# Patient Record
Sex: MALE | Race: WHITE | NOT HISPANIC OR LATINO | Employment: OTHER | URBAN - METROPOLITAN AREA
[De-identification: names, ages, dates, MRNs, and addresses within clinical notes are randomized per-mention and may not be internally consistent; named-entity substitution may affect disease eponyms.]

---

## 2017-11-01 PROBLEM — I73.9 PERIPHERAL VASCULAR DISEASE (CMS/HCC): Status: ACTIVE | Noted: 2017-10-04

## 2017-11-01 PROBLEM — J20.9 ACUTE BRONCHITIS: Status: ACTIVE | Noted: 2017-11-01

## 2017-11-01 PROBLEM — K21.9 GASTROESOPHAGEAL REFLUX DISEASE: Status: ACTIVE | Noted: 2017-11-01

## 2017-11-01 PROBLEM — M50.30 DEGENERATION OF CERVICAL INTERVERTEBRAL DISC: Status: ACTIVE | Noted: 2017-11-01

## 2017-11-01 PROBLEM — F32.A DEPRESSIVE DISORDER: Status: ACTIVE | Noted: 2017-11-01

## 2017-11-01 PROBLEM — R59.0 CERVICAL LYMPHADENOPATHY: Status: ACTIVE | Noted: 2017-11-01

## 2017-11-01 PROBLEM — Z01.818 ENCOUNTER FOR OTHER PREPROCEDURAL EXAMINATION: Status: ACTIVE | Noted: 2017-10-12

## 2017-11-01 PROBLEM — E11.9 DIABETES MELLITUS (CMS/HCC): Status: ACTIVE | Noted: 2017-11-01

## 2017-11-01 PROBLEM — E04.9 NODULAR GOITER: Status: ACTIVE | Noted: 2017-11-01

## 2017-11-01 PROBLEM — F17.200 TOBACCO DEPENDENCE SYNDROME: Status: ACTIVE | Noted: 2017-11-01

## 2017-11-01 PROBLEM — M06.9 RHEUMATOID ARTHRITIS (CMS/HCC): Status: ACTIVE | Noted: 2017-11-01

## 2017-11-01 PROBLEM — E78.5 HYPERLIPIDEMIA: Status: ACTIVE | Noted: 2017-11-01

## 2017-11-01 PROBLEM — J44.9 CHRONIC OBSTRUCTIVE LUNG DISEASE (CMS/HCC): Status: ACTIVE | Noted: 2017-11-01

## 2017-11-01 PROBLEM — G89.29 CHRONIC BACK PAIN: Status: ACTIVE | Noted: 2017-11-01

## 2017-11-01 PROBLEM — R68.82 REDUCED LIBIDO: Status: ACTIVE | Noted: 2017-11-01

## 2017-11-01 PROBLEM — M51.379 DEGENERATION OF LUMBOSACRAL INTERVERTEBRAL DISC: Status: ACTIVE | Noted: 2017-11-01

## 2017-11-01 PROBLEM — M54.9 CHRONIC BACK PAIN: Status: ACTIVE | Noted: 2017-11-01

## 2017-11-01 PROBLEM — G25.81 RESTLESS LEGS: Status: ACTIVE | Noted: 2017-11-01

## 2017-12-15 ENCOUNTER — ANCILLARY PROCEDURE (OUTPATIENT)
Dept: CARDIOLOGY | Facility: CLINIC | Age: 62
End: 2017-12-15
Payer: COMMERCIAL

## 2017-12-15 DIAGNOSIS — I73.9 PVD (PERIPHERAL VASCULAR DISEASE) (CMS/HCC): ICD-10-CM

## 2017-12-15 DIAGNOSIS — I77.9 PERIPHERAL ARTERIAL OCCLUSIVE DISEASE (CMS/HCC): ICD-10-CM

## 2017-12-15 PROCEDURE — 93922 UPR/L XTREMITY ART 2 LEVELS: CPT | Mod: PO

## 2017-12-15 PROCEDURE — 93922 UPR/L XTREMITY ART 2 LEVELS: CPT | Performed by: INTERNAL MEDICINE

## 2017-12-15 PROCEDURE — 93926 LOWER EXTREMITY STUDY: CPT | Mod: PO

## 2017-12-15 PROCEDURE — 93926 LOWER EXTREMITY STUDY: CPT | Performed by: INTERNAL MEDICINE

## 2017-12-15 PROCEDURE — 93926 LOWER EXTREMITY STUDY: CPT | Mod: LT,PO

## 2017-12-28 LAB
LEFT ABI: 1.18
LEFT ANT TIBIAL SYS PSV: 57 CM/S
LEFT ANTERIOR TIBIAL: 135 MMHG
LEFT ARM BP: 125 MMHG
LEFT PERONEAL SYS PSV: 81 CM/S
LEFT POST TIBIAL SYS PSV: 92 CM/S
LEFT POSTERIOR TIBIAL: 147 MMHG
LEFT PROFUNDA SYS PSV: 76 CM/S
LEFT SUPER FEMORAL PROX SYS PSV: 204 CM/S
LEFT SUPER FEMORAL PROX SYS RATIO: 2.17
LEFT TIB/PER TRUNK SYS PSV: 68 CM/S
LL ARTERIAL DUPLEX COMMON FEMORAL PEAK SYS VEL: 94 CM/S
LL ARTERIAL DUPLEX POPLITEAL PEAK SYS VEL: 91 CM/S
RH LOWER EXTREMITY GRAFT 1 ANAST: 66 CM/S
RH LOWER EXTREMITY GRAFT 1 DIST ANAST: 114 CM/S
RH LOWER EXTREMITY GRAFT 1 DIST: 72 CM/S
RH LOWER EXTREMITY GRAFT 1 INFLOW: 118 CM/S
RH LOWER EXTREMITY GRAFT 1 MID: 88 CM/S
RH LOWER EXTREMITY GRAFT 1 OUTFLOW: 109 CM/S
RH LOWER EXTREMITY GRAFT 1 PROX: 86 CM/S
RH LT LOWER ART ANT TIB DIST: 72 CM/S
RH LT LOWER ART PER DIST: 49 CM/S
RH LT LOWER ART POP DIST: 86 CM/S
RH LT LOWER ART POST TIB DIST: 116 CM/S
RH LT LOWER ART SFA DIST: 140
RH LT LOWER ART SFA MID: 157 CM/S
RIGHT ABI: 1.06
RIGHT ANTERIOR TIBIAL: 133 MMHG
RIGHT ARM BP: 120 MMHG
RIGHT POSTERIOR TIBIAL: 133 MMHG

## 2018-04-23 ENCOUNTER — OFFICE VISIT (OUTPATIENT)
Dept: FAMILY MEDICINE | Facility: CLINIC | Age: 63
End: 2018-04-23
Payer: MEDICARE

## 2018-04-23 VITALS
WEIGHT: 185 LBS | OXYGEN SATURATION: 96 % | HEART RATE: 90 BPM | HEIGHT: 66 IN | BODY MASS INDEX: 29.73 KG/M2 | TEMPERATURE: 98.5 F | DIASTOLIC BLOOD PRESSURE: 74 MMHG | RESPIRATION RATE: 18 BRPM | SYSTOLIC BLOOD PRESSURE: 140 MMHG

## 2018-04-23 DIAGNOSIS — M79.672 LEFT FOOT PAIN: Primary | ICD-10-CM

## 2018-04-23 PROCEDURE — 99213 OFFICE O/P EST LOW 20 MIN: CPT | Performed by: PHYSICIAN ASSISTANT

## 2018-04-23 ASSESSMENT — PAIN SCALES - GENERAL: PAINLEVEL: 8

## 2018-04-26 ASSESSMENT — ENCOUNTER SYMPTOMS
CONSTITUTIONAL NEGATIVE: 1
CARDIOVASCULAR NEGATIVE: 1
ROS SKIN COMMENTS: PAIN LEFT FOOT
RESPIRATORY NEGATIVE: 1

## 2018-05-21 ENCOUNTER — OFFICE VISIT (OUTPATIENT)
Dept: FAMILY MEDICINE | Facility: CLINIC | Age: 63
End: 2018-05-21
Payer: MEDICARE

## 2018-05-21 VITALS
OXYGEN SATURATION: 98 % | SYSTOLIC BLOOD PRESSURE: 124 MMHG | HEART RATE: 88 BPM | WEIGHT: 178.6 LBS | DIASTOLIC BLOOD PRESSURE: 80 MMHG | RESPIRATION RATE: 20 BRPM | TEMPERATURE: 98.5 F | BODY MASS INDEX: 28.7 KG/M2 | HEIGHT: 66 IN

## 2018-05-21 DIAGNOSIS — R11.2 INTRACTABLE VOMITING WITH NAUSEA, UNSPECIFIED VOMITING TYPE: Primary | ICD-10-CM

## 2018-05-21 DIAGNOSIS — R10.33 PERIUMBILICAL ABDOMINAL PAIN: ICD-10-CM

## 2018-05-21 PROCEDURE — 96372 THER/PROPH/DIAG INJ SC/IM: CPT | Performed by: FAMILY MEDICINE

## 2018-05-21 PROCEDURE — 99213 OFFICE O/P EST LOW 20 MIN: CPT | Performed by: FAMILY MEDICINE

## 2018-05-21 RX ORDER — PROMETHAZINE HYDROCHLORIDE 25 MG/ML
25 INJECTION, SOLUTION INTRAMUSCULAR; INTRAVENOUS ONCE
Status: COMPLETED | OUTPATIENT
Start: 2018-05-21 | End: 2018-05-21

## 2018-05-21 RX ADMIN — PROMETHAZINE HYDROCHLORIDE 25 MG: 25 INJECTION, SOLUTION INTRAMUSCULAR; INTRAVENOUS at 10:42

## 2018-05-22 ASSESSMENT — ENCOUNTER SYMPTOMS
FEVER: 0
DIFFICULTY URINATING: 0
ABDOMINAL PAIN: 1
NAUSEA: 1
VOMITING: 1
CONFUSION: 0
TROUBLE SWALLOWING: 1
CHILLS: 0
UNEXPECTED WEIGHT CHANGE: 0
HEADACHES: 0
ARTHRALGIAS: 0
DIZZINESS: 0
COUGH: 0
SORE THROAT: 0
BLOOD IN STOOL: 0
SHORTNESS OF BREATH: 0

## 2018-05-22 NOTE — PROGRESS NOTES
"Subjective      Patient ID: Chris Hendricks is a 62 y.o. male.    This patient is in for evaluation of nausea and vomiting that began acutely at about 3 PM yesterday after he had an episode of food becoming blocked in his esophagus.  He has had continuous symptoms since then and describes a \"burning pain\" that developed after the onset of the nausea and vomiting.  The pain is located in the periumbilical area and has not radiated.  He has not been able to take any foods in because of the nausea and vomiting.  He denies symptoms of esophageal blockage at this time.    He had a similar spell approximately 1 year ago and was hospitalized at the [facility] where he had evaluation and workup for that.  He describes that he had no firm diagnosis regarding the etiology of the symptoms at that time.    He does get his primary care at the [facility] facility.        The following have been reviewed and updated as appropriate in this visit:  No text in SmartText      Review of Systems   Constitutional: Negative for chills, fever and unexpected weight change.   HENT: Positive for trouble swallowing. Negative for congestion and sore throat.    Respiratory: Negative for cough and shortness of breath.    Cardiovascular: Negative for chest pain and leg swelling.   Gastrointestinal: Positive for abdominal pain, nausea and vomiting. Negative for blood in stool.   Genitourinary: Negative for difficulty urinating.   Musculoskeletal: Negative for arthralgias.   Neurological: Negative for dizziness and headaches.   Psychiatric/Behavioral: Negative for confusion.       Objective     Physical Exam   Constitutional: He is oriented to person, place, and time. He appears well-developed and well-nourished. No distress.   Neck: No thyromegaly present.   Cardiovascular: Normal rate, regular rhythm, normal heart sounds and intact distal pulses.    No murmur heard.  Pulmonary/Chest: Effort normal and breath sounds normal. No " respiratory distress. He has no rales.   Abdominal: Soft. Bowel sounds are normal. He exhibits no distension and no mass. There is tenderness (flower-umbilical).   Musculoskeletal: He exhibits no edema.   Lymphadenopathy:     He has no cervical adenopathy.   Neurological: He is alert and oriented to person, place, and time.   Skin: No rash noted.   Psychiatric: He has a normal mood and affect. His behavior is normal.   Vitals reviewed.      Assessment/Plan   Problem List Items Addressed This Visit     None      Visit Diagnoses     Intractable vomiting with nausea, unspecified vomiting type    -  Primary    Relevant Medications    promethazine (PHENERGAN) injection 25 mg (Completed)    Periumbilical abdominal pain            1.  He has symptoms that are unrelenting regarding nausea and vomiting and abdominal pain.  His main [facility] get [facility] and treatment.    2.  He requests an injection for nausea and I have provided promethazine.    3.  I contacted [facility] facility ([phone]) and have alerted them that he will be coming to their facility for outpatient evaluation and/or emergency room evaluation.    4.  He can follow-up with me as necessary.

## 2018-06-12 ENCOUNTER — TELEPHONE (OUTPATIENT)
Dept: CARDIOLOGY | Facility: CLINIC | Age: 63
End: 2018-06-12

## 2018-06-12 NOTE — TELEPHONE ENCOUNTER
Spoke with Bria Wheeler, [facility] the [facility] in [facility] (covering for Radha), states that Dr. Koenig request for a Graft Surveillance Ultrasound (Arterial Duplex US Lower Extremity Bilateral) with REGGIE's [facility] at the [facility]. [facility] on [facility] to fax number [phone]. Ultrasounds and REGGIE was requested at time ordered however remains in pending with Radiology. Bria will call Radiology and request testing to be performed ASAP. SofiGuSelwyn JACOBSONN II.

## 2018-07-09 ENCOUNTER — OFFICE VISIT (OUTPATIENT)
Dept: URGENT CARE | Facility: CLINIC | Age: 63
End: 2018-07-09
Payer: MEDICARE

## 2018-07-09 VITALS
DIASTOLIC BLOOD PRESSURE: 66 MMHG | HEART RATE: 79 BPM | SYSTOLIC BLOOD PRESSURE: 112 MMHG | BODY MASS INDEX: 29.54 KG/M2 | HEIGHT: 66 IN | OXYGEN SATURATION: 96 % | TEMPERATURE: 98.5 F | RESPIRATION RATE: 20 BRPM | WEIGHT: 183.8 LBS

## 2018-07-09 DIAGNOSIS — R21 RASH, SKIN: Primary | ICD-10-CM

## 2018-07-09 PROCEDURE — 99213 OFFICE O/P EST LOW 20 MIN: CPT | Performed by: FAMILY MEDICINE

## 2018-07-09 RX ORDER — TRIAMCINOLONE ACETONIDE 1 MG/G
1 CREAM TOPICAL 2 TIMES DAILY
Qty: 30 G | Refills: 1 | Status: SHIPPED | OUTPATIENT
Start: 2018-07-09 | End: 2018-09-07

## 2018-07-15 ASSESSMENT — ENCOUNTER SYMPTOMS
SHORTNESS OF BREATH: 0
DIFFICULTY URINATING: 0
DIZZINESS: 0
FEVER: 0
CONFUSION: 0
ARTHRALGIAS: 1
BACK PAIN: 1
SORE THROAT: 0
FATIGUE: 0
CHILLS: 0
COUGH: 0
ABDOMINAL PAIN: 0
HEADACHES: 0

## 2018-07-16 DIAGNOSIS — I73.9 PERIPHERAL ARTERY DISEASE (CMS/HCC): Primary | ICD-10-CM

## 2018-07-16 NOTE — PROGRESS NOTES
Subjective      Patient ID: Chris Hendricks is a 62 y.o. male.     This patient is in for evaluation of a rash on his right leg that has been present for a month.  He has no history of prior rash.  He has been working out of doors at the landfill in [place] and he has had some exposure to the contents of the landfill as well as some plant material.  He has some itching.  He is not had any bleeding or drainage.  He has not tried any particular medications.        The following have been reviewed and updated as appropriate in this visit:  No text in SmartText      Review of Systems   Constitutional: Negative for chills, fatigue and fever.   HENT: Negative for congestion and sore throat.    Respiratory: Negative for cough and shortness of breath.    Cardiovascular: Negative for chest pain and leg swelling.   Gastrointestinal: Negative for abdominal pain.   Genitourinary: Negative for difficulty urinating.   Musculoskeletal: Positive for arthralgias and back pain.   Neurological: Negative for dizziness and headaches.   Psychiatric/Behavioral: Negative for confusion.       Objective     Physical Exam   Constitutional: He is oriented to person, place, and time. He appears well-developed and well-nourished.   Cardiovascular: Normal rate and regular rhythm.    No murmur heard.  Pulmonary/Chest: Effort normal and breath sounds normal. He has no wheezes. He has no rales.   Musculoskeletal: He exhibits no edema.   Lymphadenopathy:     He has no cervical adenopathy.   Neurological: He is alert and oriented to person, place, and time.   Skin: Rash (excoriated rash on both lower extremities, worse on the right than on the left; ) noted.   Psychiatric: He has a normal mood and affect. His behavior is normal.   Vitals reviewed.      Assessment/Plan   Problem List Items Addressed This Visit     None      Visit Diagnoses     Rash, skin    -  Primary    Relevant Medications    triamcinolone (KENALOG) 0.1 % cream        1.   His skin rash is possibly an allergic reaction or contact dermatitis and I have recommended 0.1% triamcinolone cream to be taken twice a day.    2.  An over-the-counter antihistamine such as loratadine 10 mg once a day be helpful.    3.  I have discussed cool moist towels or ice packs and loosefitting clothing.    4.  He should follow-up with me as necessary.    Total time face to face spent with patient was 15 minutes with more than 50% in counseling and coordination of care regarding skin rash on his lower legs.   none

## 2018-07-23 DIAGNOSIS — I73.9 PERIPHERAL VASCULAR DISEASE (CMS/HCC): Primary | ICD-10-CM

## 2018-11-12 ENCOUNTER — OFFICE VISIT (OUTPATIENT)
Dept: FAMILY MEDICINE | Facility: CLINIC | Age: 63
End: 2018-11-12
Payer: MEDICARE

## 2018-11-12 VITALS
HEIGHT: 66 IN | DIASTOLIC BLOOD PRESSURE: 76 MMHG | TEMPERATURE: 98 F | WEIGHT: 199.2 LBS | SYSTOLIC BLOOD PRESSURE: 140 MMHG | BODY MASS INDEX: 32.02 KG/M2 | OXYGEN SATURATION: 95 % | RESPIRATION RATE: 20 BRPM | HEART RATE: 80 BPM

## 2018-11-12 DIAGNOSIS — S46.911A STRAIN OF RIGHT SHOULDER, INITIAL ENCOUNTER: ICD-10-CM
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
## 2018-11-12 PROCEDURE — 99213 OFFICE O/P EST LOW 20 MIN: CPT | Performed by: FAMILY MEDICINE

## 2018-11-12 RX ORDER — GABAPENTIN 600 MG/1
600 TABLET ORAL 3 TIMES DAILY
COMMUNITY

## 2018-11-13 ASSESSMENT — ENCOUNTER SYMPTOMS
ABDOMINAL PAIN: 0
MYALGIAS: 1
COUGH: 0
DIZZINESS: 0
DIFFICULTY URINATING: 0
HEADACHES: 0
FEVER: 0
SORE THROAT: 0
SHORTNESS OF BREATH: 0
CONFUSION: 0
ARTHRALGIAS: 1
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
## 2018-12-21 ENCOUNTER — OFFICE VISIT (OUTPATIENT)
Dept: FAMILY MEDICINE | Facility: CLINIC | Age: 63
End: 2018-12-21
Payer: MEDICARE

## 2018-12-21 VITALS
HEART RATE: 88 BPM | BODY MASS INDEX: 32.05 KG/M2 | SYSTOLIC BLOOD PRESSURE: 140 MMHG | WEIGHT: 198.6 LBS | RESPIRATION RATE: 20 BRPM | OXYGEN SATURATION: 94 % | DIASTOLIC BLOOD PRESSURE: 86 MMHG | TEMPERATURE: 97.7 F

## 2018-12-21 DIAGNOSIS — M79.671 PAIN OF RIGHT HEEL: Primary | ICD-10-CM

## 2018-12-21 PROCEDURE — 99213 OFFICE O/P EST LOW 20 MIN: CPT | Performed by: PHYSICIAN ASSISTANT

## 2019-01-02 ASSESSMENT — ENCOUNTER SYMPTOMS
CONSTITUTIONAL NEGATIVE: 1
JOINT SWELLING: 1
PSYCHIATRIC NEGATIVE: 1
ARTHRALGIAS: 1
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
## 2019-01-21 ENCOUNTER — OFFICE VISIT (OUTPATIENT)
Dept: FAMILY MEDICINE | Facility: CLINIC | Age: 64
End: 2019-01-21
Payer: MEDICARE

## 2019-01-21 VITALS
BODY MASS INDEX: 32.09 KG/M2 | OXYGEN SATURATION: 95 % | SYSTOLIC BLOOD PRESSURE: 146 MMHG | DIASTOLIC BLOOD PRESSURE: 84 MMHG | HEART RATE: 85 BPM | RESPIRATION RATE: 22 BRPM | WEIGHT: 198.8 LBS | TEMPERATURE: 97.4 F

## 2019-01-21 DIAGNOSIS — I73.9 PERIPHERAL VASCULAR DISEASE (CMS/HCC): Primary | ICD-10-CM

## 2019-01-21 DIAGNOSIS — R68.82 REDUCED LIBIDO: ICD-10-CM

## 2019-01-21 PROCEDURE — 99214 OFFICE O/P EST MOD 30 MIN: CPT | Performed by: FAMILY MEDICINE

## 2019-01-21 NOTE — PROGRESS NOTES
"Subjective      Patient ID: Chris Hendricks is a 63 y.o. male.        This patient is in for evaluation of bilateral leg pain especially with exertion with the right leg being worse than the left leg.  It should be remembered that he had revascularization of the right and left lower extremities on October 19, 2017 and had immediate improvement.  However 6-8 months after the procedure he had worsening of his symptoms.  They have continued to worsen.  ----------------------  Arterial duplex 12/2017:  Interpretation Summary     1.  Normal resting ankle brachial indices  2.  Widely patent right common femoral to above-knee popliteal bypass graft with normal flow velocities  3.  Widely patent left superficial femoral artery stent with normal flow velocities throughout the left lower externally arterial system  4.  Recommend repeat surveillance study in 6 months  -----------------------------------  He had follow-up with his cardiovascular specialist in December 2017 (see report above) but has not had follow-up since then.  However they had requested arterial Doppler studies in July 2018 but for reasons that are not known to me they were not done.    As far as I can tell he did comply with anticoagulation therapy) Plavix 75 mg/day) for 6 weeks postoperatively.  I cannot document other instructions for prolonged treatment with Plavix.  He has continued to smoke and I do not believe that he is on any lipid-lowering medication.    Additionally he has increased fatigue and tiredness.  He said he could \"sleep 12-14 hours/day\".  He has decreased sexual drive.  He has previously been treated with outpatient testosterone replacement therapy.  In 2015 he had a total testosterone of 611 and a PSA of 1.17.  Both of those results were in the normal ranges.  He is interested in beginning that therapy if it would help his fatigue and tiredness.        The following have been reviewed and updated as appropriate in this visit:     "   Review of Systems   Constitutional: Negative for fever.   HENT: Negative for congestion and sore throat.    Respiratory: Negative for cough and shortness of breath.    Cardiovascular: Negative for chest pain.   Gastrointestinal: Negative for abdominal pain.   Genitourinary: Negative for difficulty urinating.   Musculoskeletal: Positive for arthralgias and gait problem.   Neurological: Negative for dizziness and headaches.   Psychiatric/Behavioral: Negative for confusion.       Objective     Physical Exam   Constitutional: He is oriented to person, place, and time. He appears well-developed and well-nourished. No distress.   Neck: No thyromegaly present.   Cardiovascular: Normal rate, regular rhythm and normal heart sounds.   No murmur heard.  The left dorsal pedal pulse was palpable and slightly diminished.  The right dorsal pedal pulse was not palpable.  Both feet were warm to touch however.  The right femoral pulse was strong and there was a grade 2 bruit over it.  The left femoral pulse was strong and there was no bruit.   Pulmonary/Chest: Effort normal and breath sounds normal. He has no rales.   Musculoskeletal: He exhibits no edema or tenderness.   Lymphadenopathy:     He has no cervical adenopathy.   Neurological: He is alert and oriented to person, place, and time.   Skin: No rash noted. He is not diaphoretic.   Psychiatric: He has a normal mood and affect. His behavior is normal.   Vitals reviewed.      Assessment/Plan   Problem List Items Addressed This Visit        Circulatory    Peripheral vascular disease (CMS/HCC) (HCC) - Primary       Other    Reduced libido        1.  Regarding his peripheral vascular disease, I have reviewed his arterial Dopplers from December 2017 and noted they were normal.    2.  I have discussed his apparent claudication with his cardiovascular team and they have pointed out that he his July 2018 arterial Doppler evaluation did not happen.  They will move forward in scheduling  the necessary tests for him.    3.  [facility].  They showed a total cholesterol of 217, HDL 32, LDL cholesterol 140, and triglycerides 221.  I believe that he needs more aggressive lipid control in light of his peripheral arterial disease and hyperlipidemia.    4.  I have recommended no other changes in his treatment regarding his claudication at this time.    5.  Regarding his decreased libido and previous history of testosterone replacement therapy, I have advised him that we can consider that but he would need a recheck serum total testosterone, PSA, and a digital rectal exam.    Total time face to face spent with patient was 30 minutes with more than 50% in counseling and coordination of care regarding bilateral leg pain and decreased libido.

## 2019-01-22 ASSESSMENT — ENCOUNTER SYMPTOMS
ABDOMINAL PAIN: 0
HEADACHES: 0
CONFUSION: 0
DIZZINESS: 0
SORE THROAT: 0
ARTHRALGIAS: 1
COUGH: 0
SHORTNESS OF BREATH: 0
DIFFICULTY URINATING: 0
FEVER: 0

## 2019-01-22 NOTE — PROGRESS NOTES
Subjective      Patient ID: Chris Hendricks is a 63 y.o. male.    HPI    The following have been reviewed and updated as appropriate in this visit:       Review of Systems    Objective     Physical Exam    Assessment/Plan   {Assess/PlanSmartLinks:23337}

## 2019-02-04 ENCOUNTER — OFFICE VISIT (OUTPATIENT)
Dept: FAMILY MEDICINE | Facility: CLINIC | Age: 64
End: 2019-02-04
Payer: MEDICARE

## 2019-02-04 VITALS
HEART RATE: 90 BPM | SYSTOLIC BLOOD PRESSURE: 140 MMHG | OXYGEN SATURATION: 93 % | DIASTOLIC BLOOD PRESSURE: 86 MMHG | WEIGHT: 199.4 LBS | RESPIRATION RATE: 20 BRPM | BODY MASS INDEX: 32.18 KG/M2 | TEMPERATURE: 98.6 F

## 2019-02-04 DIAGNOSIS — F17.200 TOBACCO DEPENDENCE SYNDROME: ICD-10-CM

## 2019-02-04 DIAGNOSIS — R05.9 COUGH: ICD-10-CM

## 2019-02-04 DIAGNOSIS — J01.00 ACUTE MAXILLARY SINUSITIS, RECURRENCE NOT SPECIFIED: Primary | ICD-10-CM

## 2019-02-04 DIAGNOSIS — J02.9 SORE THROAT: ICD-10-CM

## 2019-02-04 LAB — RAPID STREP A (AMB): NEGATIVE

## 2019-02-04 PROCEDURE — 99213 OFFICE O/P EST LOW 20 MIN: CPT | Performed by: PHYSICIAN ASSISTANT

## 2019-02-04 PROCEDURE — 87880 STREP A ASSAY W/OPTIC: CPT | Mod: QW | Performed by: PHYSICIAN ASSISTANT

## 2019-02-04 PROCEDURE — 87070 CULTURE OTHR SPECIMN AEROBIC: CPT | Performed by: PHYSICIAN ASSISTANT

## 2019-02-04 RX ORDER — BENZONATATE 200 MG/1
200 CAPSULE ORAL 3 TIMES DAILY PRN
Qty: 30 CAPSULE | Refills: 1 | Status: SHIPPED | OUTPATIENT
Start: 2019-02-04 | End: 2019-10-30 | Stop reason: ALTCHOICE

## 2019-02-04 RX ORDER — CODEINE PHOSPHATE AND GUAIFENESIN 10; 100 MG/5ML; MG/5ML
SOLUTION ORAL
Qty: 180 ML | Refills: 0 | Status: SHIPPED | OUTPATIENT
Start: 2019-02-04 | End: 2019-10-30 | Stop reason: ALTCHOICE

## 2019-02-04 RX ORDER — AMOXICILLIN AND CLAVULANATE POTASSIUM 875; 125 MG/1; MG/1
1 TABLET, FILM COATED ORAL 2 TIMES DAILY
Qty: 20 TABLET | Refills: 0 | Status: SHIPPED | OUTPATIENT
Start: 2019-02-04 | End: 2019-02-14

## 2019-02-07 LAB — BACTERIA THROAT CULT: NORMAL

## 2019-02-11 ASSESSMENT — ENCOUNTER SYMPTOMS
COUGH: 1
FATIGUE: 1
SINUS PAIN: 1
SORE THROAT: 1
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
## 2019-10-30 ENCOUNTER — OFFICE VISIT (OUTPATIENT)
Dept: FAMILY MEDICINE | Facility: CLINIC | Age: 64
End: 2019-10-30
Payer: MEDICARE

## 2019-10-30 VITALS
WEIGHT: 173.4 LBS | HEART RATE: 72 BPM | DIASTOLIC BLOOD PRESSURE: 78 MMHG | TEMPERATURE: 98.9 F | RESPIRATION RATE: 20 BRPM | OXYGEN SATURATION: 94 % | BODY MASS INDEX: 27.87 KG/M2 | SYSTOLIC BLOOD PRESSURE: 128 MMHG | HEIGHT: 66 IN

## 2019-10-30 DIAGNOSIS — E78.5 HYPERLIPIDEMIA, UNSPECIFIED HYPERLIPIDEMIA TYPE: ICD-10-CM

## 2019-10-30 DIAGNOSIS — I73.9 PERIPHERAL VASCULAR DISEASE (CMS/HCC): ICD-10-CM

## 2019-10-30 DIAGNOSIS — M51.379 DEGENERATION OF LUMBOSACRAL INTERVERTEBRAL DISC: Primary | ICD-10-CM

## 2019-10-30 PROBLEM — N40.0 BENIGN PROSTATIC HYPERPLASIA: Status: ACTIVE | Noted: 2019-10-30

## 2019-10-30 PROBLEM — G43.909 MIGRAINES: Status: RESOLVED | Noted: 2019-10-30 | Resolved: 2019-10-30

## 2019-10-30 PROBLEM — Z72.0 TOBACCO ABUSE: Status: ACTIVE | Noted: 2019-10-30

## 2019-10-30 PROCEDURE — 99213 OFFICE O/P EST LOW 20 MIN: CPT | Performed by: PHYSICIAN ASSISTANT

## 2019-10-30 RX ORDER — ATORVASTATIN CALCIUM 40 MG/1
40 TABLET, FILM COATED ORAL DAILY
Qty: 30 TABLET | Refills: 0 | Status: SHIPPED | OUTPATIENT
Start: 2019-10-30

## 2019-10-30 RX ORDER — NAPROXEN 500 MG/1
500 TABLET ORAL 2 TIMES DAILY WITH MEALS
COMMUNITY
End: 2019-10-30 | Stop reason: SDUPTHER

## 2019-10-30 RX ORDER — NAPROXEN 500 MG/1
500 TABLET ORAL 2 TIMES DAILY WITH MEALS
Qty: 60 TABLET | Refills: 1 | Status: SHIPPED | OUTPATIENT
Start: 2019-10-30

## 2019-10-30 RX ORDER — HYDROCODONE BITARTRATE AND ACETAMINOPHEN 5; 325 MG/1; MG/1
1 TABLET ORAL EVERY 4 HOURS PRN
Qty: 30 TABLET | Refills: 0 | Status: SHIPPED | OUTPATIENT
Start: 2019-10-30

## 2019-10-30 RX ORDER — CLOPIDOGREL BISULFATE 75 MG/1
75 TABLET ORAL DAILY
COMMUNITY

## 2019-10-30 RX ORDER — ATORVASTATIN CALCIUM 40 MG/1
40 TABLET, FILM COATED ORAL DAILY
COMMUNITY
End: 2019-10-30 | Stop reason: SDUPTHER

## 2019-10-30 ASSESSMENT — PAIN SCALES - GENERAL: PAINLEVEL: 6

## 2019-10-30 ASSESSMENT — ENCOUNTER SYMPTOMS
CARDIOVASCULAR NEGATIVE: 1
RESPIRATORY NEGATIVE: 1
BACK PAIN: 1
ARTHRALGIAS: 1
ACTIVITY CHANGE: 1

## 2019-10-31 NOTE — PROGRESS NOTES
HPI  Chris Hendricks is a 63 y.o. male who presents for for med refill.  He got a been moving to [place] next week.  He needs refill of medications to get him by until [facility] [place].  He has history of chronic back pain.  He is needing pain medication as well as gabapentin.  He also takes naproxen that does help some.    Also has history of heart disease and is on Plavix.  Also requires refill of his Lipitor.  Denies any current anginal equivalent symptoms.    PMH     Allergies   Allergen Reactions   • Cyclobenzaprine    • Lidocaine    • Varenicline      Current Outpatient Medications   Medication Sig Dispense Refill   • clopidogrel (PLAVIX) 75 mg tablet Take 75 mg by mouth daily     • naproxen (NAPROSYN) 500 mg tablet Take 1 tablet (500 mg total) by mouth 2 (two) times a day with meals 60 tablet 1   • atorvastatin (LIPITOR) 40 mg tablet Take 1 tablet (40 mg total) by mouth daily 30 tablet 0   • gabapentin (NEURONTIN) 600 mg tablet Take 600 mg by mouth 3 (three) times a day.     • HYDROcodone-acetaminophen (NORCO) 5-325 mg per tablet Take 1 tablet by mouth every 4 (four) hours as needed for pain scale 8-10/10 Max Daily Amount: 6 tablets 30 tablet 0     No current facility-administered medications for this visit.      Past Medical History:   Diagnosis Date   • Arthritis     back, knees, and shoulders   • Benign prostatic hyperplasia    • Chronic neck pain    • History of anesthesia reaction     can become very violent if awakened during sleep   • Lumbar stenosis    • Migraines    • Post traumatic stress disorder    • Tobacco abuse     smokes 2 packs per day     Past Surgical History:   Procedure Laterality Date   • APPENDECTOMY     • CERVICAL SPINE SURGERY     • HAND SURGERY Left     with 2nd finger amputated   • LAMINECTOMY      Lumbar - L4-L5 x5, ACF C6-7 x2, (total of 10 surgeries)   • VASCULAR SURGERY Right 10/19/2017    S/P CFA endarterectomy, right CFA to AK pop bypass  "with probe patent Bomoseen-Gino graft     Family History   Problem Relation Age of Onset   • Heart failure Father 56        Cause of death   • Diabetes Sister    • Coronary artery disease Other    • Heart attack Other    • Kidney disease Mother      Social History     Occupational History   • Not on file   Tobacco Use   • Smoking status: Heavy Tobacco Smoker     Packs/day: 2.00     Years: 50.00     Pack years: 100.00     Types: Cigarettes   • Smokeless tobacco: Never Used   Substance and Sexual Activity   • Alcohol use: No   • Drug use: No   • Sexual activity: Defer   Social History Narrative   • Not on file       Review of Systems   Constitutional: Positive for activity change.   Respiratory: Negative.    Cardiovascular: Negative.    Musculoskeletal: Positive for arthralgias and back pain.       Objective     /78 (BP Location: Left arm, Patient Position: Sitting, Cuff Size: Reg)   Pulse 72   Temp 37.2 °C (98.9 °F) (Temporal)   Resp 20   Ht 1.676 m (5' 6\")   Wt 78.7 kg (173 lb 6.4 oz)   SpO2 94%   BMI 27.99 kg/m²     Physical Exam  63-year-old male that appears in no acute distress.  Lungs are clear.  Heart regular rate and rhythm without murmur rubs gallops.  Abdomen normal.  Fairly extensive arthritic changes of the extremities.  No pedal edema    Assessment/Plan     Diagnoses and all orders for this visit:    Degeneration of lumbosacral intervertebral disc  -     HYDROcodone-acetaminophen (NORCO) 5-325 mg per tablet; Take 1 tablet by mouth every 4 (four) hours as needed for pain scale 8-10/10 Max Daily Amount: 6 tablets  -     naproxen (NAPROSYN) 500 mg tablet; Take 1 tablet (500 mg total) by mouth 2 (two) times a day with meals    Peripheral vascular disease (CMS/HCC) (HCC)    Hyperlipidemia, unspecified hyperlipidemia type  -     atorvastatin (LIPITOR) 40 mg tablet; Take 1 tablet (40 mg total) by mouth daily    DDD lumbar spine.  Did refill his Naprosyn and hydrocodone.  Will need to follow-up when he " gets to [place] with the [facility].    Peripheral vascular disease.  Did refill his Plavix.    Hyperlipidemia.  Refilled his Lipitor.  [facility] according to the patient.    Procedures    ARLENE HASTINGS PA-C

## 2021-01-11 ENCOUNTER — OFFICE VISIT (OUTPATIENT)
Dept: URBAN - NONMETROPOLITAN AREA CLINIC 18 | Facility: CLINIC | Age: 66
End: 2021-01-11
Payer: COMMERCIAL

## 2021-01-11 DIAGNOSIS — H26.493 OTHER SECONDARY CATARACT, BILATERAL: Chronic | ICD-10-CM

## 2021-01-11 DIAGNOSIS — H43.811 VITREOUS DEGENERATION, RIGHT EYE: Chronic | ICD-10-CM

## 2021-01-11 DIAGNOSIS — H52.4 PRESBYOPIA: Primary | Chronic | ICD-10-CM

## 2021-01-11 DIAGNOSIS — H02.834 DERMATOCHALASIS OF LEFT UPPER EYELID: Chronic | ICD-10-CM

## 2021-01-11 DIAGNOSIS — H02.831 DERMATOCHALASIS OF RIGHT UPPER EYELID: Chronic | ICD-10-CM

## 2021-01-11 DIAGNOSIS — H04.123 DRY EYE SYNDROME OF BILATERAL LACRIMAL GLANDS: Chronic | ICD-10-CM

## 2021-01-11 PROCEDURE — 92004 COMPRE OPH EXAM NEW PT 1/>: CPT | Performed by: OPTOMETRIST

## 2021-01-11 ASSESSMENT — INTRAOCULAR PRESSURE
OD: 17
OS: 19

## 2021-01-11 ASSESSMENT — VISUAL ACUITY
OS: 20/25
OD: 20/25

## 2021-01-11 NOTE — IMPRESSION/PLAN
Impression: Dry eye syndrome of bilateral lacrimal glands: H04.123. Plan: Discussed chronic nature of condition in detail with patient. Explained condition does not have a cure and will need artificial tears for maintenance. Recommended artificial tears (Refresh or Systane brand) QID OU for continuous maintenance of tear film. Recommend ocusoft lid scrubs for lid hygiene.

## 2021-01-11 NOTE — IMPRESSION/PLAN
Impression: Other secondary cataract, bilateral: H26.493. Plan: Monitor, limited symptoms experience.